# Patient Record
(demographics unavailable — no encounter records)

---

## 2025-01-15 NOTE — HEALTH RISK ASSESSMENT
[No] : In the past 12 months have you used drugs other than those required for medical reasons? No [0] : 2) Feeling down, depressed, or hopeless: Not at all (0) [[id number]] : 0 [Never] : Never [Patient reported PAP Smear was normal] : Patient reported PAP Smear was normal [PapSmearDate] : 2024
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
